# Patient Record
Sex: MALE | Race: BLACK OR AFRICAN AMERICAN | NOT HISPANIC OR LATINO | Employment: UNEMPLOYED | URBAN - METROPOLITAN AREA
[De-identification: names, ages, dates, MRNs, and addresses within clinical notes are randomized per-mention and may not be internally consistent; named-entity substitution may affect disease eponyms.]

---

## 2017-08-08 ENCOUNTER — APPOINTMENT (OUTPATIENT)
Dept: RADIOLOGY | Facility: MEDICAL CENTER | Age: 47
End: 2017-08-08
Attending: EMERGENCY MEDICINE
Payer: MEDICAID

## 2017-08-08 ENCOUNTER — HOSPITAL ENCOUNTER (EMERGENCY)
Facility: MEDICAL CENTER | Age: 47
End: 2017-08-08
Attending: EMERGENCY MEDICINE
Payer: MEDICAID

## 2017-08-08 VITALS
HEIGHT: 71 IN | RESPIRATION RATE: 18 BRPM | SYSTOLIC BLOOD PRESSURE: 130 MMHG | DIASTOLIC BLOOD PRESSURE: 84 MMHG | BODY MASS INDEX: 22.84 KG/M2 | HEART RATE: 81 BPM | TEMPERATURE: 97.2 F | WEIGHT: 163.14 LBS | OXYGEN SATURATION: 98 %

## 2017-08-08 PROCEDURE — 99282 EMERGENCY DEPT VISIT SF MDM: CPT

## 2017-08-08 ASSESSMENT — PAIN SCALES - GENERAL: PAINLEVEL_OUTOF10: 10

## 2017-08-09 NOTE — ED NOTES
"Chief Complaint   Patient presents with   • Shoulder Pain     Pt noticed at work yesterday lifting something above his head and hd significant pain to his left shoulder. Pain in his left shoulder has been going on for a while and progressively getting worse.      Pain is unrelieved to icy hot, advil at home.   /84 mmHg  Pulse 81  Temp(Src) 36.2 °C (97.2 °F) (Temporal)  Resp 18  Ht 1.803 m (5' 11\")  Wt 74 kg (163 lb 2.3 oz)  BMI 22.76 kg/m2  SpO2 98%  Pt placed back in lobby, educated on triage process, and told to inform staff of any change in condition.     "

## 2017-08-09 NOTE — ED PROVIDER NOTES
"      ED Provider Note    Scribed for Dr. Bernabe Stewart M.D. by Jean Pierre Leblanc. 8/8/2017, 7:31 PM.    Primary Care Provider: Pcp Pt States None  Means of arrival: Walk In  History obtained from: Patient  History limited by: None    CHIEF COMPLAINT  Chief Complaint   Patient presents with   • Shoulder Pain     Pt noticed at work yesterday lifting something above his head and hd significant pain to his left shoulder. Pain in his left shoulder has been going on for a while and progressively getting worse.      HPI  Carlo Luis is a 46 y.o. male who presents to the Emergency Department with left shoulder pain. The patient complains of chronic left shoulder pain that has been worsening in the last 2 months. Last night at work, the patient lifted his left arm upwards which significantly exacerbated his left shoulder pain. He rates his current pain as 6/10 in severity. The patient has medicated his left shoulder pain with Ibuprofen and ice at home, which has given him very minimal relief of his left shoulder pain. The patient denies any recent trauma to his left shoulder.   The patient denies any numbness or focal weakness.     REVIEW OF SYSTEMS  Pertinent positives include left shoulder pain. Pertinent negatives include no numbness, focal weakness.     E.     PAST MEDICAL HISTORY   has a past medical history of Bipolar affective disorder.    SOCIAL HISTORY  Social History   Substance Use Topics   • Smoking status: Current Every Day Smoker -- 0.50 packs/day for 28 years     Types: Cigarettes   • Alcohol Use: No      Comment: occ      History   Drug Use Not on file     SURGICAL HISTORY  patient denies any surgical history     CURRENT MEDICATIONS  Home Medications     **Home medications have not yet been reviewed for this encounter**          ALLERGIES  No Known Allergies    PHYSICAL EXAM  VITAL SIGNS: /84 mmHg  Pulse 81  Temp(Src) 36.2 °C (97.2 °F) (Temporal)  Resp 18  Ht 1.803 m (5' 11\")  Wt 74 kg (163 lb 2.3 oz)  " BMI 22.76 kg/m2  SpO2 98%    Pulse ox interpretation: Normal  Constitutional: Well developed, Well nourished, No acute distress, Non-toxic appearance.   Cardiovascular: Normal heart rate, Normal rhythm, No murmurs, No rubs, No gallops.   Thorax & Lungs: Normal breath sounds, No respiratory distress, No wheezing, No chest tenderness.   Skin: Warm, Dry, No erythema, No rash.  Extremities: Left upper extremity: Anterior left shoulder tenderness. Intact distal pulses, No cyanosis, No clubbing. Pain with range of motion of the left shoulder. Unable to lift greater than 90°.  Neurologic: Alert & oriented x 3, Normal motor function, Normal sensory function, No focal deficits noted.    RADIOLOGY  DX-SHOULDER 2+ LEFT    (Results Pending)     The radiologist's interpretation of all radiological studies have been reviewed by me.    COURSE & MEDICAL DECISION MAKING  Nursing notes, Iwona SOLITARIO reviewed in chart.    7:31 PM - Patient seen and examined at bedside. Ordered Left Shoulder X Ray to evaluate his symptoms.       Decision Making:  This is a 46 y.o. year old who presents with left shoulder pain. He denies any direct trauma to this area. Has been present for several months however worse today. 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 No numbness or weakness. No obvious bony deformities. Does feel a crunching sensation according to the patient's when he moves his shoulder. Attempted to perform x-ray of the shoulder however the patient left from the emergency department prior to the imaging study. The patient eloped without informing staff here. I was unable to speak with the patient before he left the emergency department.    Ultimately, there did not appear to be significant bony deformity. Possibly a tendinitis of the long head of the biceps versus rotator cuff strain. Cannot rule out significant arthritis or acromioclavicular joint disease process such as an before meals  separation.      DISPOSITION:  Patient eloped from the emergency department    FOLLOW UP:  No follow-up provider specified.    OUTPATIENT MEDICATIONS:  New Prescriptions    No medications on file       FINAL IMPRESSION  Left shoulder pain  Tobacco abuse    This dictation has been created using voice recognition software and/or scribes. The accuracy of the dictation is limited by the abilities of the software and the expertise of the scribes. I expect there may be some errors of grammar and possibly content. I made every attempt to manually correct the errors within my dictation. However, errors related to voice recognition software and/or scribes may still exist and should be interpreted within the appropriate context.    The note accurately reflects work and decisions made by me.  Bernabe Stewart  8/8/2017  8:13 PM

## 2017-08-09 NOTE — ED NOTES
"Pt in paredes, asked if he needed help, pt states, \"to hell with this hospital, I don't want to be here anymore, I can buy my own aspirin\", pt ambulated from department with strong steady giat in NAD at this time.  "

## 2018-05-03 ENCOUNTER — OFFICE VISIT (OUTPATIENT)
Dept: INTERNAL MEDICINE | Facility: MEDICAL CENTER | Age: 48
End: 2018-05-03
Payer: MEDICAID

## 2018-05-03 VITALS
BODY MASS INDEX: 25.42 KG/M2 | HEART RATE: 80 BPM | TEMPERATURE: 97.2 F | HEIGHT: 69 IN | WEIGHT: 171.6 LBS | DIASTOLIC BLOOD PRESSURE: 86 MMHG | OXYGEN SATURATION: 96 % | SYSTOLIC BLOOD PRESSURE: 128 MMHG | RESPIRATION RATE: 17 BRPM

## 2018-05-03 DIAGNOSIS — Z13.29 SCREENING FOR ENDOCRINE, METABOLIC, AND IMMUNITY DISORDER: ICD-10-CM

## 2018-05-03 DIAGNOSIS — Z13.228 SCREENING FOR ENDOCRINE, METABOLIC, AND IMMUNITY DISORDER: ICD-10-CM

## 2018-05-03 DIAGNOSIS — Z00.00 HEALTH CARE MAINTENANCE: ICD-10-CM

## 2018-05-03 DIAGNOSIS — Z11.3 ENCOUNTER FOR SCREENING FOR INFECTIONS WITH PREDOMINANTLY SEXUAL MODE OF TRANSMISSION: ICD-10-CM

## 2018-05-03 DIAGNOSIS — Z86.79 HISTORY OF HYPERTENSION: ICD-10-CM

## 2018-05-03 DIAGNOSIS — Z86.59 HISTORY OF BIPOLAR DISORDER: ICD-10-CM

## 2018-05-03 DIAGNOSIS — Z13.0 SCREENING FOR ENDOCRINE, METABOLIC, AND IMMUNITY DISORDER: ICD-10-CM

## 2018-05-03 PROCEDURE — G0438 PPPS, INITIAL VISIT: HCPCS | Mod: GC | Performed by: INTERNAL MEDICINE

## 2018-05-03 ASSESSMENT — PAIN SCALES - GENERAL: PAINLEVEL: NO PAIN

## 2018-05-03 ASSESSMENT — PATIENT HEALTH QUESTIONNAIRE - PHQ9: CLINICAL INTERPRETATION OF PHQ2 SCORE: 0

## 2018-05-03 ASSESSMENT — ACTIVITIES OF DAILY LIVING (ADL): BATHING_REQUIRES_ASSISTANCE: 0

## 2018-05-03 NOTE — PATIENT INSTRUCTIONS
Use bag balm for feet   Please contact [facility] provider for assessment and  resuming treatment for Bipolar illness

## 2018-05-03 NOTE — PROGRESS NOTES
New Patient to Establish    Reason to establish: New Patient to Establish Primary Care    CC: Wellness Exam    HPI: Patient is a pleasant 47 yr old male  Afro-American, recently released from [place] [facility] after 9 yrs, currently working as [place]  at [employer], is here to establish primary care with us, chiefly for a wellness exam.    He has past medical hx of Bipolar illness well controlled with Lithium since teenager through 2014, which he stopped himself, and never followed up at [facility]. Currently he declined any symptoms of Julee or depression, nor any suicidal or homicidal thoughts.     Of Note:  However when he had to wait for sometime longer during precepting with Attending physician he became restless, and stated he does not like closed doors which make him suffocate, and asked me to open the door!     He declined any family hx of Bipolar illness or depression, both parents healthy.    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  He was told to have Hep C while in [facility] but never took treatment. Declined hx of jaundice or urethral discharge.            Patient Active Problem List    Diagnosis Date Noted   • Health care maintenance 05/03/2018   • History of bipolar disorder 05/03/2018   • Screening for endocrine, metabolic, and immunity disorder 05/03/2018       Past Medical History:   Diagnosis Date   • Bipolar affective disorder (HCC) 1991    Lithium stopped in 2014 at [facility] (per patient)   • Hypertension 1991    Stopped treatment after few months       Current Outpatient Prescriptions   Medication Sig Dispense Refill   • lithium CR (LITHOBID) 300 MG TBCR Take 300 mg by mouth 3 times a day. Indications: Manic-Depression       No current facility-administered medications for this visit.        Allergies as of  "05/03/2018   • (No Known Allergies)       Social History     Social History   • Marital status: Single     Spouse name: N/A   • Number of children: 1   • Years of education: GED     Occupational History   •       [employer]     Social History Main Topics   • Smoking status: Current Every Day Smoker     Packs/day: 0.50     Years: 28.00     Types: Cigarettes   • Smokeless tobacco: Never Used   • Alcohol use No      Comment: occ   • Drug use: Yes     Frequency: 3.0 times per week     Types: Marijuana      Comment: Previously abused Meth   • Sexual activity: Yes     Partners: Female     Other Topics Concern   • Not on file     Social History Narrative    Was in [facility] in [place] for 9 yrs after Gambling related Fraudulant charges, released in 2014    Was on Bipolar disorder treatment until 2014 at [facility]    Stopped Lithium by himself       Family History   Problem Relation Age of Onset   • No Known Problems Mother    • No Known Problems Father        History reviewed. No pertinent surgical history.    ROS: As per HPI. Additional pertinent symptoms as noted below.    Declined any hx of Diabetes, CAD, Stroke    /86   Pulse 80   Temp 36.2 °C (97.2 °F)   Resp 17   Ht 1.753 m (5' 9\")   Wt 77.8 kg (171 lb 9.6 oz)   SpO2 96%   BMI 25.34 kg/m²     Physical Exam    General:  Alert and oriented, No apparent distress,   Of Note:  when he had to wait for sometime longer during precepting with Attending physician he became restless, and stated he does not like closed doors which make him suffocate, and asked me to open the door!     Eyes: Pupils equal and reactive. No scleral icterus.  Throat: Clear no erythema or exudates noted.  Neck: Supple. No lymphadenopathy noted. Thyroid not enlarged.  Lungs: Clear to auscultation and percussion bilaterally.  Cardiovascular: Regular rate and rhythm. No murmurs, rubs or gallops.  Abdomen:  Benign. No rebound or guarding noted.  Extremities: No " clubbing, cyanosis, edema.  Skin: Clear. No rash or suspicious skin lesions noted.  Other: Pleasant Mood, no suicidal or homicidal content of thoughts      Assessment and Plan      Wellness visit Population [place] 45 to 54 @ AGE 46 yo male  is here for preventive health visit, patient preventive services needs include      A. Metabolic      1. ASCVD    Lipid Profile ordered    2. Hypertension  Hx of HTN as a teenager, not sure any work up done  Clinical exam unremarkable  Ordered CBC, Chem Panel, TSH, Lipid Panel    3. Diabetes mellitus  HbA1C ordered    B. Lifestyle/Functionality     1. Obesity: Normal BMI       2.Smoking   Smokes cigarettes, and Marihuana, abused Methamphetamine as a teenager but quit long ago, does not drink alcohol or IV drugs  Counseled for Cessation, not ready yet.      3. Vitamin supplements  Not on any meds  Ordered Vitamin D levels      4. DJD  None      C. Cancer screening    1. Colorectal cancer (average risk)  Will consider Colonoscopy at 49 yrs     2. Prostate cancer   Will consider PSA testing at 49 yrs      D. Infectious Diseases     1. HIV   Not sure if tested while in [facility]  Ordered HIV 4th gen panel      2. Hepatitis B virus   Ordered Hep Panel    3. Hepatitis C virus (HCV)         Ordered Hep Panel    4. Tuberculosis    Quantiferon Gold    5.STIs  Chlamydia / Gonorrhea   Urine GC -Chlamydia DNA probe test ordered    Syphilis  Ordered RPR        E. Behavioral         1. History of bipolar disorder      Was on long term Lithium therapy discontinued on his own since 2014      Referral to Psychiatry given      Patient advised to contact [facility]            Depression  PHQ2- 0  Hx of Bipolar illness on Lithium d/cd on his own  Psych referral given      2. Alcohol abuse  None    3. Smoking Smokes cigarettes, and Marihuana, abused Methamphetamine as a teenager but quit long ago, does not drink alcohol or IV drugs. Counseled for Cessation, not ready yet.        4. Polysubstance abuse (IVDU,  cocaine, heroin, etc.) - NA      F. Immunizations     1. Influenza   Received last winter     2. Tetanus, Diphtheria, and Pertussis (Tdap)  He is not sure, records to be obtained    3. Pneumococcal vaccines -NA       4. Hepatitis A          He is not sure, records to be obtained    5. Hepatitis B        He is not sure, records to be obtained      Risk Assessment (discuss potential complications a function of chronic problems): as above    Complexity (discuss number of co-morbidities): as above

## 2018-05-16 LAB
25(OH)D3+25(OH)D2 SERPL-MCNC: 16.5 NG/ML (ref 30–100)
ALBUMIN/CREAT UR: 6.7 MG/G CREAT (ref 0–30)
BASOPHILS # BLD AUTO: 0 X10E3/UL (ref 0–0.2)
BASOPHILS NFR BLD AUTO: 0 %
C TRACH RRNA SPEC QL NAA+PROBE: NEGATIVE
CHOLEST SERPL-MCNC: 252 MG/DL (ref 100–199)
CREAT UR-MCNC: 68.9 MG/DL
EOSINOPHIL # BLD AUTO: 0.2 X10E3/UL (ref 0–0.4)
EOSINOPHIL NFR BLD AUTO: 2 %
ERYTHROCYTE [DISTWIDTH] IN BLOOD BY AUTOMATED COUNT: 13.3 % (ref 12.3–15.4)
HAV IGM SERPL QL IA: NEGATIVE
HBA1C MFR BLD: 5.7 % (ref 4.8–5.6)
HBV CORE IGM SERPL QL IA: NEGATIVE
HBV SURFACE AG SERPL QL IA: NEGATIVE
HCT VFR BLD AUTO: 51.7 % (ref 37.5–51)
HCV AB S/CO SERPL IA: >11 S/CO RATIO (ref 0–0.9)
HDLC SERPL-MCNC: 52 MG/DL
HGB BLD-MCNC: 17.6 G/DL (ref 13–17.7)
HIV 1+2 AB+HIV1 P24 AG SERPL QL IA: NON REACTIVE
IMM GRANULOCYTES # BLD: 0.1 X10E3/UL (ref 0–0.1)
IMM GRANULOCYTES NFR BLD: 1 %
IMMATURE CELLS  115398: ABNORMAL
LABORATORY COMMENT REPORT: ABNORMAL
LDLC SERPL CALC-MCNC: 158 MG/DL (ref 0–99)
LYMPHOCYTES # BLD AUTO: 3.2 X10E3/UL (ref 0.7–3.1)
LYMPHOCYTES NFR BLD AUTO: 30 %
MCH RBC QN AUTO: 31.5 PG (ref 26.6–33)
MCHC RBC AUTO-ENTMCNC: 34 G/DL (ref 31.5–35.7)
MCV RBC AUTO: 93 FL (ref 79–97)
MICROALBUMIN UR-MCNC: 4.6 UG/ML
MONOCYTES # BLD AUTO: 1 X10E3/UL (ref 0.1–0.9)
MONOCYTES NFR BLD AUTO: 9 %
MORPHOLOGY BLD-IMP: ABNORMAL
N GONORRHOEA RRNA SPEC QL NAA+PROBE: NEGATIVE
NEUTROPHILS # BLD AUTO: 6.1 X10E3/UL (ref 1.4–7)
NEUTROPHILS NFR BLD AUTO: 58 %
NRBC BLD AUTO-RTO: ABNORMAL %
PLATELET # BLD AUTO: 193 X10E3/UL (ref 150–379)
RBC # BLD AUTO: 5.59 X10E6/UL (ref 4.14–5.8)
T4 FREE SERPL DIALY-MCNC: 0.64 NG/DL
TRIGL SERPL-MCNC: 212 MG/DL (ref 0–149)
TSH SERPL-ACNC: 0.72 UU/ML
VLDLC SERPL CALC-MCNC: 42 MG/DL (ref 5–40)
WBC # BLD AUTO: 10.6 X10E3/UL (ref 3.4–10.8)

## 2018-05-18 ENCOUNTER — TELEPHONE (OUTPATIENT)
Dept: INTERNAL MEDICINE | Facility: MEDICAL CENTER | Age: 48
End: 2018-05-18

## 2018-05-18 DIAGNOSIS — R76.8 HEPATITIS C ANTIBODY POSITIVE IN BLOOD: ICD-10-CM

## 2018-05-18 NOTE — TELEPHONE ENCOUNTER
Orders are placed for Blood draw for Hep C Viral RNA erik. Please call the patient and let him know.